# Patient Record
Sex: FEMALE | Race: WHITE | NOT HISPANIC OR LATINO | URBAN - METROPOLITAN AREA
[De-identification: names, ages, dates, MRNs, and addresses within clinical notes are randomized per-mention and may not be internally consistent; named-entity substitution may affect disease eponyms.]

---

## 2018-05-21 ASSESSMENT — MIFFLIN-ST. JEOR: SCORE: 1380.86

## 2018-05-23 ENCOUNTER — ANESTHESIA - HEALTHEAST (OUTPATIENT)
Dept: SURGERY | Facility: HOSPITAL | Age: 28
End: 2018-05-23

## 2018-05-23 ENCOUNTER — SURGERY - HEALTHEAST (OUTPATIENT)
Dept: SURGERY | Facility: HOSPITAL | Age: 28
End: 2018-05-23

## 2019-09-27 ENCOUNTER — RECORDS - HEALTHEAST (OUTPATIENT)
Dept: ADMINISTRATIVE | Facility: OTHER | Age: 29
End: 2019-09-27

## 2019-09-30 ENCOUNTER — ANESTHESIA - HEALTHEAST (OUTPATIENT)
Dept: OBGYN | Facility: HOSPITAL | Age: 29
End: 2019-09-30

## 2019-10-08 ENCOUNTER — AMBULATORY - HEALTHEAST (OUTPATIENT)
Dept: PEDIATRICS | Facility: CLINIC | Age: 29
End: 2019-10-08

## 2019-10-12 ENCOUNTER — COMMUNICATION - HEALTHEAST (OUTPATIENT)
Dept: SCHEDULING | Facility: CLINIC | Age: 29
End: 2019-10-12

## 2019-10-21 ENCOUNTER — AMBULATORY - HEALTHEAST (OUTPATIENT)
Dept: PEDIATRICS | Facility: CLINIC | Age: 29
End: 2019-10-21

## 2019-10-25 ENCOUNTER — AMBULATORY - HEALTHEAST (OUTPATIENT)
Dept: PEDIATRICS | Facility: CLINIC | Age: 29
End: 2019-10-25

## 2021-05-26 ENCOUNTER — RECORDS - HEALTHEAST (OUTPATIENT)
Dept: ADMINISTRATIVE | Facility: CLINIC | Age: 31
End: 2021-05-26

## 2021-05-28 ENCOUNTER — RECORDS - HEALTHEAST (OUTPATIENT)
Dept: ADMINISTRATIVE | Facility: CLINIC | Age: 31
End: 2021-05-28

## 2021-06-01 VITALS — BODY MASS INDEX: 20.46 KG/M2 | WEIGHT: 135 LBS | HEIGHT: 68 IN

## 2021-06-01 NOTE — ANESTHESIA PROCEDURE NOTES
Epidural Block    Patient location during procedure: OB  Time Called: 9/30/2019 11:01 PM  Reason for Block:labor epidural  Staffing:  Performing  Anesthesiologist: Gregorio Mendoza MD  Preanesthetic Checklist  Completed: patient identified, risks, benefits, and alternatives discussed, timeout performed, consent obtained, at patient's request, airway assessed, oxygen available, suction available, emergency drugs available and hand hygiene performed  Procedure  Patient position: sitting  Prep: ChloraPrep  Patient monitoring: continuous pulse oximetry  Approach: midline  Location: L3-L4  Injection technique: ANAYELI saline  Number of Attempts:1  Needle  Needle type: Arvin   Needle gauge: 18 G     Catheter in Space: 5  Assessment  Sensory level: T8  No complications      Additional Notes:  Called to patient's room for epidural  Consent obtained  Timeput performed  RN at bedside during procedure

## 2021-06-01 NOTE — ANESTHESIA POSTPROCEDURE EVALUATION
Patient: Claribel Rebolledo  Anesthesia type: epidural    Patient location: Labor and Delivery  Last vitals:   Pulse Readings from Last 1 Encounters:   10/01/19 63     SpO2 Readings from Last 1 Encounters:   10/01/19 97%     BP Readings from Last 1 Encounters:   10/01/19 107/60     Resp Readings from Last 1 Encounters:   10/01/19 16     Temp Readings from Last 1 Encounters:   10/01/19 36.7  C (98  F) (Oral)       Post vital signs: stable  Level of consciousness: awake and responds to simple questions  Post-anesthesia pain: pain controlled  Post-anesthesia nausea and vomiting: no  Pulmonary: unassisted, return to baseline  Cardiovascular: stable and blood pressure at baseline  Hydration: adequate  Anesthetic events: no    QCDR Measures:  ASA# 11 - Pallavi-op Cardiac Arrest: ASA11B - Patient did NOT experience unanticipated cardiac arrest  ASA# 12 - Pallavi-op Mortality Rate: ASA12B - Patient did NOT die  ASA# 13 - PACU Re-Intubation Rate: NA - No ETT / LMA used for case  ASA# 10 - Composite Anes Safety: ASA10A - No serious adverse event    Additional Notes:  Neuraxial block partially worn off, some mild heaviness in left lower extremity. Pain controlled. Ambulating and voiding without issue. Denies headache or back pain.

## 2021-06-02 NOTE — TELEPHONE ENCOUNTER
"PCP Dr Jeniffer Horvath @ [facility]  Childbirth 10/1 Home care visit a week ago.  Woke up this morning with a swollen painful lump in her right underarm. The lump is about a little bigger than pea sized, possibly marble sized. It hurts with movement or touching the area. Pain=\"3\" currently. She pumped and massaged the area, but this did not help. She tried a warm compress for 15 min and it helped a little.   The lump is not red or hot. No drainage. No problem with her breast noted. She does not feel like she has a fever. (She has not checked her temperature yet, but she will monitor her temperature at least every day.  For the last 24 hrs she has had mild (\"3\") cramping with urination, no burning. No previous hx of UTI. Cramping is in the pubic area, just towards the end of urination, and while urinating. Occasionally it happens when not urinating, briefly. Low back pain yesterday, she took Ibuprofen and it was relieved. No back pain today.     Reason for Disposition    [1] Very tender to the touch AND [2] no fever    Pain or burning with urination    Protocols used: LYMPH NODES [id number]-R-FC, POSTPARTUM - URINATION PAIN-A-AH    Triaged to a disposition of See provider within 24 hrs. She may continue to apply warm compresses to the lump, since it felt better afterwards. Discussed options: WIC, UC, UR, or ER within 24 hrs. Also discussed option of contacting oncall PCP if desired.    Jayla Sherman RN Triage Nurse Advisor  "

## 2021-06-02 NOTE — PROGRESS NOTES
[facility] Lactation Visit    Assessment:  Susan Rebolledo is a 2 wk.o. old female infant born at Gestational Age: 37w5d via Vaginal, Spontaneous delivery on 10/1/2019 at 2:01 AM here for lactation support.    Susan Rebolledo is doing well. Susan Rebolledo has gained 8.4 oz since their last visit 7 days ago; approximately 1.2 oz per day.  She is up 7% from birthweight. She was able to transfer 1.1 oz from the breast today but required a nipple shield to sustain a latch. This is less intake that I would expect an infant as this age. Discussed supplementation as needed after nursing.     1.  difficulty in feeding at breast  cholecalciferol, vitamin D3, 400 unit/drop Drop       Plan:    Feeding plan:  It is important for you to breast-feed Susan Rebolledo every 2-3 hours or more frequently based on Susan Rebolledo's cues. This should be about 8-12 times a day. Start all feeds at the breast first. Offer both breasts for each feeding. The goal is for Susan to feed more frequently at the breast. Listen for swallows. If swallows have slowed down, stimulate Susan to actively nurse. May express breast milk in her mouth to help stimulate sucking.     Supplementation plan:  Susan Rebolledo should be supplemented with expressed breast-milk or formula after breast-feeding as needed. May offer supplement if Susan is still showing signs of hunger immediately after nursing.     Pumping plan:  Pump as needed for relief. Pump especially after nursing if breasts still feel full or heavy. If supply seems to be decreasing, may try power pumping for 1 hour. May try mother's milk tea or go-lacta to help increase milk supply.    Start Vitamin D supplementation 400 international units, once a day.     Susan Rebolledo should have about 6-8 wet diapers and about 2-4 stools per day.     Follow up in clinic in 1-2 weeks for a follow up and weight  check.  Please return to clinic sooner, if Susan Rebolledo is not wanting to feed, becomes more sleepy, has less than 4-6 wet diapers in a day, develops a fever greater than 100.4 F, or is inconsolable.   ____________________________________________________________________  SUBJECTIVE:     Susan Rebolledo is a 2 wk.o. old female infant born at Gestational Age: 37w5d via Vaginal, Spontaneous delivery on 10/1/2019 at 2:01 AM here for lactation support. This patient is accompanied in the office by her mother. She was seen in clinic on 10/8/19. Concern for jaundice at that time but not at phototherapy range. She had been mainly bottle-feeding prior to the clinic visit on 10/8/19 and has been working on transitioning to more frequent feedings at the breast.     Baby is nursing every 2-3 hours for about 20-30 minutes per session.   Mother reports hearing audible swallows.   Baby feeds about 4-6 times in 24 hours and wakes up on own for feeds. She would supplement 3-4 times a day. She gets about 2-3 oz. Mother sometimes skips breast-feeding and offers a bottle of 2-3 oz. All bottles are pumped milk.    Baby has about 7 wet diapers and 3 stools per day. Stools are yellow in color.    Mom is also pumping about 4-5 per day and gets about  ml per pumping session.     Patient Active Problem List    Diagnosis Date Noted     Feeding problem of  10/03/2019     Fetal and  jaundice 10/03/2019     Term , current hospitalization 10/01/2019     SGA (small for gestational age) 10/01/2019     Results for orders placed or performed in visit on 10/08/19   Bilirubin, Panel   Result Value Ref Range    Bilirubin, Direct 0.4 <=0.5 mg/dL    Bilirubin, Indirect 13.8 (H) 0.0 - 6.0 mg/dL    Bilirubin, Total 14.2 (H) 0.0 - 6.0 mg/dL       Current Outpatient Medications:      cholecalciferol, vitamin D3, 400 unit/drop Drop, Take 1 drop by mouth daily., Disp: , Rfl: 0  No past medical history on file.  No  past surgical history on file.  No family history on file.    Primary care provider: Jeniffer Horvath MD    OBJECTIVE:    Mother:   Nipples are everted, the areola is compressible, the breast is soft and full.     Sore nipples: none   Maternal pain: none    Maternal depression screening: Doing well    Infant:   Vitals:    10/15/19 1349   Temp: 98.5  F (36.9  C)   TempSrc: Rectal   Weight: 5 lb 15.5 oz (2.707 kg)       Average weight gain over last 7 days: 8.4 oz     Weight:   Birthweight: 5 lb 9 oz (2.523 kg)  Clinic weight on 10/8/19: 5 lbs 7.1 oz  Today's weight:   Vitals:    10/15/19 1349   Weight: 5 lb 15.5 oz (2.707 kg)       7% from birth weight.    Lactation scale pre-feeding weight: 5 lbs 15.5 oz  Weight after left side feedin lbs 15.9 oz  Weight after right side feedin lbs 0.6 oz  Amount of milk transferred from LEFT side: 0.4 oz  Amount of milk transferred from RIGHT side: 0.7 oz    Total transfer: 1.1 oz    Feeding assessment:     Infant can draw gloved finger into mouth. Infant demonstrated a coordinated suck. Infant palate is intact, tongue over gums, palpable frenulum  Infant can hold suction with tongue while at the breast for only a few sucks. Infant unlatches and then tries again. She required a nipple shield to sustain a longer latch.  Infant needed frequent stimulation at the breast.     Alignment: Infant's head was aligned with its trunk. Infant did face mother. Baby was in cross-cradle position today. Discussed importance of infant ventral positioning to obtain a deeper latch.     Areolar Grasp: Infant was able to open mouth wide with a nipple shield. Infant was assisted by gently applying downward pressure to the chin to open mouth wide with latching attempts without a nipple shield. Infant's lips were not pursed. Infant's lips did flange outward. Tongue was visible just barely over bottom lip. Infant had complete seal with a nipple shield.     Areolar Compression: Infant made  "rhythmic motion. There were no clicking or smacking sounds. There was no severe nipple discomfort. Nipples appeared round after feeding.    Audible swallowing: Infant made a few quiet sounds of swallowing on the left. More audible swallows noted on the right. There was an increase in frequency after milk ejection reflex.     PHYSICAL EXAM    Gen: Alert, no acute distress.   Head: Anterior and posterior fontanelles are flat and soft.   Eyes: No eye drainage. Sclera clear. Bilateral red reflexes present.   Ears: Pinna appear well-formed. No pits.   Nose: nares patent. No nasal congestion. No nasal flaring.  Mouth: Oral mucosa moist. Tongue midline (tongue elevation adequate when crying, good lateralization). Frenulum palpable. No \"heart-shaped\" tongue. Tongue able to extend pass lower gumline. Lips closed at rest.   Neck: Clavicles intact bilaterally.  Lungs: Clear to auscultation bilaterally.   Cardiac: Regular regular rate and rhythm, S1S2, no murmurs. Brachial and femoral pulses +2 and equal.  Abdomen: Soft, nontender, bowel sounds present, no hepatosplenomegaly or mass palpable. Umbilicus dry with no erythema or drainage.   : Paul stage 1 female genitalia  Skin: Intact. Dry. No rash. No jaundice.   Musculoskeletal: equal movements of upper and lower extremities. Negative Ortolani and Hurley maneuver.  Neuro: Appropriate muscle tone.    The visit lasted a total of 40 minutes that I spent face to face with the patient. Of that time, 35 minutes were spent on lactation. Over 50% of the time was spent counseling and educating the patient about normal  care and growth.    Completed by:   Solo Alcala, MARIA LUISA, CPNP, IBCLC  [facility]  [facility]  10/21/2019, 1:51 PM  "

## 2021-06-02 NOTE — PROGRESS NOTES
[facility] Lactation Visit    Assessment:  Susan Rebolledo is a 3 wk.o. old female infant born at Gestational Age: 37w5d via Vaginal, Spontaneous delivery on 10/1/2019 at 2:01 AM here for lactation support.    Susan Rebolledo is doing well. Susan Rebolledo has gained 11.9 oz since their last visit 10 days ago; approximately 1.1 oz per day, which is adequate at this age.  She is up 21% from birthweight. She is now mainly breast-feeding in clinic. She was able to transfer 3.2 oz from the breast today. She required the nipple shield for part of the feeding on the left side. Encouraged to start feedings without a nipple shield to help wean off the shield.     1.  difficulty in feeding at breast         Plan:    Feeding plan:  It is important for you to breast-feed Susan Rebolledo every 2-3 hours or more frequently based on Susan Rebolledo's cues. This should be about 8-12 times a day. Now start all feedings without a nipple shield. May use a nipple shield if unable to latch. Make sure lips are flanged. If latch is painful, unlatch and try again. Burp after each breast and after feeding.     Supplementation plan:  Supplement only as needed.     Pumping plan:  Pump only as needed. Pump after nursing if breasts still feel full.     Start Vitamin D supplementation 400 international units, once a day.    Susan Rebolledo should have about 6-8 wet diapers and about 2-4 stools per day.     Follow up in clinic in for 2 month wellness visit.  Return to clinic as needed for lactation visit.    Please return to clinic sooner, if Susan Rebolledo is not wanting to feed, becomes more sleepy, has less than 4-6 wet diapers in a day, develops a fever greater than 100.4 F, or is inconsolable.   ____________________________________________________________________  SUBJECTIVE:     Susan Rebolledo is a 3 wk.o. old female infant born at Gestational Age:  37w5d via Vaginal, Spontaneous delivery on 10/1/2019 at 2:01 AM here for lactation support. This patient is accompanied in the office by her mother and father.    Concerns: working on transitioning to more frequent breast-feeding. Still using a nipple shield and now would like to wean off nipple shield    Baby is nursing every 2-2.5/3 hours for about 10-15 minutes per session. Gets one feeding of bottled pumped milk 2-3 oz. Has needed supplementation immediately after nursing 1-2 times a day for about 1 oz each time.  Mother reports hearing audible swallows.   Baby feeds about 9-10 times in 24 hours and wakes up on own for feeds sometimes.    Baby has about 7 wet diapers and 1-5 stools per day. Stools are yellow in color.    Mom is also pumping about 1-2 per day and gets about 3-4 oz per pumping session.    Patient Active Problem List    Diagnosis Date Noted     Feeding problem of  10/03/2019     Fetal and  jaundice 10/03/2019     Term , current hospitalization 10/01/2019     SGA (small for gestational age) 10/01/2019     Results for orders placed or performed in visit on 10/08/19   Bilirubin, Panel   Result Value Ref Range    Bilirubin, Direct 0.4 <=0.5 mg/dL    Bilirubin, Indirect 13.8 (H) 0.0 - 6.0 mg/dL    Bilirubin, Total 14.2 (H) 0.0 - 6.0 mg/dL       Current Outpatient Medications:      cholecalciferol, vitamin D3, 400 unit/drop Drop, Take 1 drop by mouth daily., Disp: , Rfl: 0  No past medical history on file.  No past surgical history on file.  No family history on file.    Primary care provider: Jeniffer Horvath MD    OBJECTIVE:    Mother:   Nipples are everted, the areola is compressible, the breast is soft and full.     Sore nipples: none   Maternal pain: none.    Maternal depression screening: Doing well    Infant:   Vitals:    10/25/19 1357   Pulse: 168   Temp: 98.8  F (37.1  C)   TempSrc: Axillary   Weight: 6 lb 11.4 oz (3.045 kg)       Average weight gain over last 10 days:  11.9 oz; approx 1.1 oz per day     Weight:   Birthweight: 5 lb 9 oz (2.523 kg)  Clinic weight on 10/15/19: 5 lbs 15.5 oz  Today's weight:   Vitals:    10/25/19 1357   Weight: 6 lb 11.4 oz (3.045 kg)       21% from birth weight.    Lactation scale pre-feeding weight: 6 lbs 11.4 oz  Weight after left side feedin lbs 14 oz  Weight after right side feedin lbs 14.6 oz  Amount of milk transferred from LEFT side: 2.6 oz oz  Amount of milk transferred from RIGHT side: 0.6 oz    Total transfer: 3.2 oz    Feeding assessment:     Infant can draw gloved finger into mouth. Infant demonstrated a coordinated suck. Infant palate is intact, tongue over gums, frenulum palpable.  Infant can hold suction with tongue while at the breast. She latched on the left side for about 5 minutes with a nipple shield. She then would unlatch herself and then try to latch again. A nipple shield was used to help sustain a longer latch. Infant did not need frequent stimulation at the breast.     Alignment: Infant's head was aligned with its trunk. Infant did face mother. Baby was in cross-cradle position today. Discussed importance of infant ventral positioning to obtain a deeper latch.     Areolar Grasp: Infant was able to open mouth wide with a nipple shield Infant was assisted by gently applying downward pressure to the chin to open mouth wide without a nipple shield. Infant's lips were not pursed. Infant's lips did flange outward. Tongue was visible just barely over bottom lip. Infant had complete seal with and without a nipple shield.     Areolar Compression: Infant made rhythmic motion. There were no clicking or smacking sounds. There was no severe nipple discomfort.  Nipples appeared round after feeding.    Audible swallowing: Infant made quiet sounds of swallowing. There was an increase in frequency after milk ejection reflex.     PHYSICAL EXAM    Gen: Alert, no acute distress.   Head: Anterior and posterior fontanelles are flat and  "soft.   Eyes: No eye drainage. Sclera clear.  Ears: Pinna appear well-formed. No pits.   Nose: nares patent. No nasal congestion. No nasal flaring.  Mouth: Oral mucosa moist. Tongue midline (tongue elevation adequate when crying, good lateralization). Frenulum palpable. No \"heart-shaped\" tongue. Tongue able to extend pass lower gumline. Lips closed at rest.   Neck: Clavicles intact bilaterally.  Lungs: Clear to auscultation bilaterally.   Cardiac: Regular regular rate and rhythm, S1S2, no murmurs. Brachial and femoral pulses +2 and equal.  Abdomen: Soft, nontender, bowel sounds present, no hepatosplenomegaly or mass palpable. Umbilicus dry dry with no erythema or drainage.   : Paul stage 1 female genitalia  Skin: Intact. Dry. Mild erythema toxicum rash on torso. No jaundice.   Musculoskeletal: equal movements of upper and lower extremities. Negative Ortolani and Hurley maneuver.  Neuro: Appropriate muscle tone.    The visit lasted a total of 40 minutes that I spent face to face with the patient. Of that time, 35 minutes were spent on lactation. Over 50% of the time was spent counseling and educating the patient about normal  care and growth, breast-feeding, weaning off nipple shield, milk supply.    Completed by:   MARIA LUISA Shelton, CPNP, IBCLC  [facility]  [facility]  10/25/2019, 2:04 PM                "

## 2021-06-02 NOTE — PROGRESS NOTES
[facility] Lactation Visit    Assessment:  Susan Rebolledo is a 7 days old female infant born at Gestational Age: 37w5d via Vaginal, Spontaneous delivery on 10/1/2019 at 2:01 AM here for lactation support.    Susan Rebolledo is doing well. Susan Rebolledo has gained 2.1 oz since their last visit 3 days ago; approximately 0.7 oz per day.  She is down -2% from birthweight. She is mainly bottle-feeding at home and nursing at least 1-2 times a day with a nipple shield. Infant was able to sustain a latch today with a nipple shield and was able to transfer 0.8 oz. We discussed to start all feedings at the breast before any bottles to help infant transition to more frequent breast-feeding. Discussed to continue with supplementation after nursing as infant becomes more efficient with breast-feeding. Discussed the importance of watching for infant cues and listening for swallows vs watching clock/time.     Infant also has jaundice down to her chest today. Her most recent bilirubin was 10.9 at 59 hours, low intermediate risk. Hyperbilirubinemia risk factors include exclusive breast-feeding and difficulty with nursing. Ordered a serum bilirubin today and will call family with results. Plan to follow up in clinic in 2 days for jaundice. Discussed with parents that they may cancel the close follow up appointment if bilirubin result is WNL.     1. Fetal and  jaundice  Bilirubin, Panel   2.  difficulty in feeding at breast  Bilirubin, Panel       Plan:    Feeding plan:  It is important for you to breast-feed Susan Rebolledo every 2-3 hours or more frequently based on Susan Rebolledo's cues. This should be about 8-12 times a day. It is important that you don't wait longer than 3 hours for the next feeding. Make sure to offer of breasts first before any bottles. Attempt nursing without a nipple shield first. If unable to latch infant for a minute, may use a nipple  shield. Stimulate Sarah if she is starting to fall asleep at the breast. Listen for swallows. If swallows are less frequent, express some breast milk in her mouth to help stimulate sucking. Offer both breasts for each feeding. If baby is sleepy on one side even after stimulation, unlatch baby, burp her, and switch to the other side. Typically baby will need to feed 10-15 minutes on each side. But it's important to not watch the clock. Watch baby, instead, to ensure she is actively nursing.     Supplementation plan:  Susan Rebolledo should be supplemented with expressed breast-milk or formula after breast-feeding to ensure that Susan DEEPALI Parkbrugger is full and content. You can supplement with a slow-flow bottle nipple. May offer 1-1.5 oz after nursing if infant is still showing signs of hunger.  This amount may increase based on her cues. If infant breast-feeds well with audible swallows and is sleepy after nursing, may skip supplementation. Make sure to watch and look for infant cues. Don't watch the clock.     Pumping plan:  To ensure you have adequate milk supply, you should continue to pump after feeds as much as able. The more your breast-feed and pump, the more you make more milk. Empty breasts gives you more milk for the next feeding.     Start Vitamin D supplementation 400 international units, once a day, when Susan Rebolledo is back to birthweight.    Susan Rebolledo should have about 6-8 wet diapers and about 2-4 stools per day.     Follow up in clinic in 2 days for a follow up on jaundice.  May cancel this appointment if jaundice is not worrisome with lab results.  Return to clinic in 1 week for lactation visit.    Please return to clinic sooner, if Susan Rebolledo is not wanting to feed, becomes more sleepy, has less than 4-6 wet diapers in a day, develops a fever greater than 100.4 F, increasing signs of jaundice, or is inconsolable.  ____________________________________________________________________  SUBJECTIVE:     Susan Rebolledo is a 7 days old female infant born at Gestational Age: 37w5d via Vaginal, Spontaneous delivery on 10/1/2019 at 2:01 AM here for lactation support. This patient is accompanied in the office by her mother and father. She was discharged on 10/3/19.     Concerns: fed well with a bottle. Mom is using nipple shield for nursing. Baby has been bottle-feeding due to weight loss in the hospital.    Baby is nursing with a nipple shield every 1-2 times a day for about 5-10 minutes per session.   Mother reports hearing audible swallows.     She is bottle-feeding for the rest of the feedings. She is taking 30-45 ml of pumped milk every 2.5-3.5 hours.    Baby feeds about 8 times in 24 hours. She is sleepy, but mostly sleepy while bottle-feeding.     Baby has about 7 wet diapers and 4 stools per day. Stools are yellow in color.    Mom is also pumping every 3-4 hours and gets about  ml per pumping session. Mom noticed her breasts grew larger and areolas darkened during pregnancy and she noticed primary engorgement when her milk came in on day 3.    Breastfeeding Goals: no pumping. Ideally without nipple shield    Previous Breastfeeding Experience: none.    Breast-surgery: none.    Maternal medications: vitamin and stool softener.  Infant medications: none.    Hospital course: serum bilirubin of 10.9 at 59 hours, low intermediate risk.     metabolic screening: normal    Patient Active Problem List    Diagnosis Date Noted     Feeding problem of  10/03/2019     Fetal and  jaundice 10/03/2019     Term , current hospitalization 10/01/2019     SGA (small for gestational age) 10/01/2019     Results for orders placed or performed during the hospital encounter of 10/01/19   [place] Metabolic Screen   Result Value Ref Range    Scan Result See Scanned Report    Bilirubin,  Total   Result Value  Ref Range    Bilirubin, Total 10.9 (H) 0.0 - 7.0 mg/dL    Age in Hours 59 hours   POCT Glucose   Result Value Ref Range    Glucose 59 51 - 139 mg/dL   POCT Glucose   Result Value Ref Range    Glucose 61 51 - 139 mg/dL   POCT Glucose   Result Value Ref Range    Glucose 43 (L) 51 - 139 mg/dL   POCT Glucose   Result Value Ref Range    Glucose 79 51 - 139 mg/dL   POCT Glucose   Result Value Ref Range    Glucose 42 (L) 51 - 139 mg/dL   POCT Glucose   Result Value Ref Range    Glucose 58 51 - 139 mg/dL   POCT Glucose   Result Value Ref Range    Glucose 58 51 - 139 mg/dL   POCT Glucose   Result Value Ref Range    Glucose 56 51 - 139 mg/dL   POCT Glucose   Result Value Ref Range    Glucose 57 51 - 139 mg/dL   POCT Glucose   Result Value Ref Range    Glucose 61 51 - 139 mg/dL   POCT Glucose   Result Value Ref Range    Glucose 49 (L) 51 - 139 mg/dL   POCT Glucose   Result Value Ref Range    Glucose 62 51 - 139 mg/dL     No current outpatient medications on file.  No past medical history on file.  No past surgical history on file.  No family history on file.    Primary care provider: Jeniffer Horvath MD    OBJECTIVE:    Mother:   Nipples are everted, the areola is compressible, the breast is soft and full.     Sore nipples: mild erythema   Maternal pain: none.    Maternal depression screening: Doing well    Infant:   Vitals:    10/08/19 1307   Pulse: 140   Temp: 98.4  F (36.9  C)   TempSrc: Rectal   Weight: 5 lb 7.1 oz (2.469 kg)       Average weight gain over last 3 days: 2.1 oz     Weight:   Birthweight: 5 lb 9 oz (2.523 kg)  Discharge weight on 10/3/19: 5 lbs 4.6 oz  Clinic weight on 10/5/19: 5 lbs 5 oz  Today's weight:   Vitals:    10/08/19 1307   Weight: 5 lb 7.1 oz (2.469 kg)       -2% from birth weight.    Lactation scale pre-feeding weight: 5 lbs 7.1 oz  Weight after left side feedin lbs 7.6 oz  Weight after right side feedin lbs 7.9 oz  Amount of milk transferred from LEFT side: 0.5 oz  Amount of milk  "transferred from RIGHT side: 0.3 oz    Total transfer: 0.8 oz    Feeding assessment:     Infant can draw gloved finger into mouth. Infant demonstrated a coordinated suck. Infant palate is intact, tongue over gums, palpable frenulum.   Infant unable to hold suction with tongue while at the breast. Infant needed frequent stimulation at the breast. Mother expressed breast milk in her mouth to help stimulate sucking.     Alignment: Infant's head was aligned with its trunk. Infant did face mother. Baby was in cross-cradle position today. Discussed importance of infant ventral positioning to obtain a deeper latch.     Areolar Grasp: Infant was able to open mouth wide.  Infant's lips were not pursed. Infant's lips did flange outward. Tongue was visible just barely over bottom lip. Infant had complete seal with a nipple shield. Unable to latch without a nipple shield.     Areolar Compression: Infant made rhythmic motion. There were no clicking or smacking sounds. There was no severe nipple discomfort.  Nipples appeared round and red after feeding.    Audible swallowing: Infant made quiet sounds of swallowing. There was an increase in frequency after milk ejection reflex.     PHYSICAL EXAM    Gen: Alert, no acute distress.   Head: Anterior and posterior fontanelles are flat and soft.   Eyes: bilateral yellow drainage. Scleral icterus.  Ears: Pinna appear well-formed. No pits.   Nose: nares patent. No nasal congestion. No nasal flaring.  Mouth: Oral mucosa moist. Tongue midline (tongue elevation adequate when crying, good lateralization). Frenulum palpable. No \"heart-shaped\" tongue. Tongue able to extend pass lower gumline. Lips closed at rest.   Neck: Clavicles intact bilaterally.  Lungs: Clear to auscultation bilaterally.   Cardiac: Regular regular rate and rhythm, S1S2, no murmurs. Brachial and femoral pulses +2 and equal.  Abdomen: Soft, nontender, bowel sounds present, no hepatosplenomegaly or mass palpable. Umbilicus " dry with no erythema or drainage.   : Paul stage 1 female genitalia  Skin: Intact. Dry. No rash. Moderate jaundice down to chest.   Musculoskeletal: equal movements of upper and lower extremities. Negative Ortolani and Hurley maneuver.  Neuro: Appropriate muscle tone.    The visit lasted a total of 60 minutes that I spent face to face with the patient. Of that time, 55 minutes were spent on lactation. Over 50% of the time was spent counseling and educating the patient about normal  care and growth, breast-feeding, latching, milk supply, lacrimal dacryostenosis.    Completed by:   Solo Alcala, MARIA LUISA, CPNP, IBCLC  [facility]  [facility]  10/8/2019, 1:03 PM

## 2021-06-18 NOTE — ANESTHESIA PREPROCEDURE EVALUATION
Anesthesia Evaluation      Patient summary reviewed   No history of anesthetic complications     Airway   Mallampati: I  Neck ROM: full   Pulmonary - negative ROS and normal exam                          Cardiovascular - negative ROS and normal exam  Rhythm: regular  Rate: normal,         Neuro/Psych - negative ROS     Endo/Other - negative ROS      GI/Hepatic/Renal - negative ROS           Dental - normal exam                        Anesthesia Plan  Planned anesthetic: MAC    ASA 1     Anesthetic plan and risks discussed with: patient    Post-op plan: routine recovery

## 2021-06-18 NOTE — ANESTHESIA POSTPROCEDURE EVALUATION
Patient: Claribel Cary  HYSTEROSCOPY,  DILATION AND CURETTAGE  Anesthesia type: MAC    Patient location: PACU  Last vitals:   Vitals:    05/23/18 1115   BP: 115/62   Pulse: 80   Resp: 14   Temp:    SpO2: 100%     Post vital signs: stable  Level of consciousness: alert and conversant  Post-anesthesia pain: pain controlled  Post-anesthesia nausea and vomiting: no  Pulmonary: room air  Cardiovascular: stable and blood pressure at baseline  Hydration: adequate  Anesthetic events: no    QCDR Measures:  ASA# 11 - Pallavi-op Cardiac Arrest: ASA11B - Patient did NOT experience unanticipated cardiac arrest  ASA# 12 - Pallavi-op Mortality Rate: ASA12B - Patient did NOT die  ASA# 13 - PACU Re-Intubation Rate: ASA13B - Patient did NOT require a new airway mgmt  ASA# 10 - Composite Anes Safety: ASA10A - No serious adverse event    Additional Notes:

## 2021-06-18 NOTE — ANESTHESIA CARE TRANSFER NOTE
"Last vitals:   Vitals:    05/23/18 1100   BP: 113/58   Pulse: 75   Resp: 16   Temp: 37.6  C (99.7  F)   SpO2: 100%     Patient's level of consciousness is drowsy but awake. Rates pain 1/10. States she is \"comfortable\".  Spontaneous respirations: yes  Maintains airway independently: yes  Dentition unchanged: yes  Oropharynx: oropharynx clear of all foreign objects    QCDR Measures:  ASA# 20 - Surgical Safety Checklist: WHO surgical safety checklist completed prior to induction  PQRS# 430 - Adult PONV Prevention: 4558F - Pt received => 2 anti-emetic agents (different classes) preop & intraop  ASA# 8 - Peds PONV Prevention: NA - Not pediatric patient, not GA or 2 or more risk factors NOT present  PQRS# 424 - Pallavi-op Temp Management: 4559F - At least one body temp DOCUMENTED => 35.5C or 95.9F within required timeframe  PQRS# 426 - PACU Transfer Protocol: - Transfer of care checklist used  ASA# 14 - Acute Post-op Pain: ASA14B - Patient did NOT experience pain >= 7 out of 10  "

## 2022-11-23 LAB
ABO (EXTERNAL): NORMAL
HEMOGLOBIN (EXTERNAL): 12.9 G/DL (ref 11.6–15.1)
HEPATITIS B SURFACE ANTIGEN (EXTERNAL): NEGATIVE
HEPATITIS C ANTIBODY (EXTERNAL): NEGATIVE
HIV1+2 AB SERPL QL IA: NEGATIVE
RH (EXTERNAL): POSITIVE
RUBELLA ANTIBODY IGG (EXTERNAL): NORMAL
VDRL (SYPHILIS) (EXTERNAL): NEGATIVE

## 2023-05-08 ENCOUNTER — TRANSFERRED RECORDS (OUTPATIENT)
Dept: HEALTH INFORMATION MANAGEMENT | Facility: CLINIC | Age: 33
End: 2023-05-08

## 2023-05-08 LAB — GROUP B STREPTOCOCCUS (EXTERNAL): NEGATIVE

## 2023-05-13 ENCOUNTER — HOSPITAL ENCOUNTER (OUTPATIENT)
Facility: HOSPITAL | Age: 33
End: 2023-05-13
Admitting: FAMILY MEDICINE
Payer: COMMERCIAL

## 2023-05-13 ENCOUNTER — HOSPITAL ENCOUNTER (OUTPATIENT)
Facility: HOSPITAL | Age: 33
Discharge: HOME | End: 2023-05-13
Attending: FAMILY MEDICINE | Admitting: FAMILY MEDICINE
Payer: COMMERCIAL

## 2023-05-13 VITALS — SYSTOLIC BLOOD PRESSURE: 113 MMHG | DIASTOLIC BLOOD PRESSURE: 58 MMHG | TEMPERATURE: 98.6 F | RESPIRATION RATE: 18 BRPM

## 2023-05-13 PROBLEM — Z36.89 ENCOUNTER FOR TRIAGE IN PREGNANT PATIENT: Status: ACTIVE | Noted: 2023-05-13

## 2023-05-13 PROCEDURE — G0463 HOSPITAL OUTPT CLINIC VISIT: HCPCS

## 2023-05-13 RX ORDER — LIDOCAINE 40 MG/G
CREAM TOPICAL
Status: DISCONTINUED | OUTPATIENT
Start: 2023-05-13 | End: 2023-05-13 | Stop reason: HOSPADM

## 2023-05-13 NOTE — PROGRESS NOTES
Data: Patient assessed in the [facility] for vaginal bleeding. Cervical exam deferred. Membranes intact. Contractions are not present. See flowsheets for fetal assessment documentation.     Action: Presumed adequate fetal oxygenation documented. Discharge instructions reviewed. Patient instructed to report change in fetal movement, vaginal leaking of fluid or bleeding, abdominal pain, or any concerns related to the pregnancy to provider/clinic.      Response: Orders to discharge home per provider. Patient verbalized understanding of education and agreement with plan. Discharged to home at 1828.    Monica Garcia RN

## 2023-05-13 NOTE — PROGRESS NOTES
[facility] light brown spotting that began this AM. Patient noted small brown spotting on her underwear, then a little when she wiped and RN observed a small streak of clear brown-stephan discharge on patient's pad on admission.     Patient endorses good fetal movement, denies headache, epigastric pain and visual disturbances. Denies leaking of fluid. FHR Category I, one contraction noted on monitor. Patient states she feels only occasional vanessa bazan activity. Abdomen palpates soft, patient afebrile.    Patient admits to sexual intercourse in the last 48 hours.     Provider updated on patient status, okay to discharge. Patient states she is okay to go home, has clinic appt scheduled for Monday.     Monica Garcia RN

## 2023-05-15 ENCOUNTER — TRANSFERRED RECORDS (OUTPATIENT)
Dept: HEALTH INFORMATION MANAGEMENT | Facility: CLINIC | Age: 33
End: 2023-05-15
Payer: COMMERCIAL

## 2023-06-09 ENCOUNTER — HOSPITAL ENCOUNTER (INPATIENT)
Facility: HOSPITAL | Age: 33
LOS: 1 days | Discharge: HOME | End: 2023-06-10
Attending: FAMILY MEDICINE | Admitting: OBSTETRICS & GYNECOLOGY
Payer: COMMERCIAL

## 2023-06-09 PROBLEM — Z34.90 PREGNANCY: Status: ACTIVE | Noted: 2023-06-09

## 2023-06-09 PROCEDURE — 120N000001 HC R&B MED SURG/OB

## 2023-06-09 PROCEDURE — 250N000013 HC RX MED GY IP 250 OP 250 PS 637: Performed by: OBSTETRICS & GYNECOLOGY

## 2023-06-09 PROCEDURE — 250N000013 HC RX MED GY IP 250 OP 250 PS 637: Performed by: FAMILY MEDICINE

## 2023-06-09 PROCEDURE — 250N000011 HC RX IP 250 OP 636: Performed by: FAMILY MEDICINE

## 2023-06-09 PROCEDURE — 722N000001 HC LABOR CARE VAGINAL DELIVERY SINGLE

## 2023-06-09 RX ORDER — NALOXONE HYDROCHLORIDE 0.4 MG/ML
0.2 INJECTION, SOLUTION INTRAMUSCULAR; INTRAVENOUS; SUBCUTANEOUS
Status: DISCONTINUED | OUTPATIENT
Start: 2023-06-09 | End: 2023-06-09 | Stop reason: HOSPADM

## 2023-06-09 RX ORDER — MODIFIED LANOLIN
OINTMENT (GRAM) TOPICAL
Status: DISCONTINUED | OUTPATIENT
Start: 2023-06-09 | End: 2023-06-10 | Stop reason: HOSPADM

## 2023-06-09 RX ORDER — CARBOPROST TROMETHAMINE 250 UG/ML
250 INJECTION, SOLUTION INTRAMUSCULAR
Status: DISCONTINUED | OUTPATIENT
Start: 2023-06-09 | End: 2023-06-09 | Stop reason: HOSPADM

## 2023-06-09 RX ORDER — PROCHLORPERAZINE 25 MG
25 SUPPOSITORY, RECTAL RECTAL EVERY 12 HOURS PRN
Status: DISCONTINUED | OUTPATIENT
Start: 2023-06-09 | End: 2023-06-09 | Stop reason: HOSPADM

## 2023-06-09 RX ORDER — OXYTOCIN 10 [USP'U]/ML
10 INJECTION, SOLUTION INTRAMUSCULAR; INTRAVENOUS
Status: DISCONTINUED | OUTPATIENT
Start: 2023-06-09 | End: 2023-06-10 | Stop reason: HOSPADM

## 2023-06-09 RX ORDER — IBUPROFEN 800 MG/1
800 TABLET, FILM COATED ORAL
Status: DISCONTINUED | OUTPATIENT
Start: 2023-06-09 | End: 2023-06-10 | Stop reason: HOSPADM

## 2023-06-09 RX ORDER — DOCUSATE SODIUM 100 MG/1
100 CAPSULE, LIQUID FILLED ORAL DAILY
Status: DISCONTINUED | OUTPATIENT
Start: 2023-06-09 | End: 2023-06-10 | Stop reason: HOSPADM

## 2023-06-09 RX ORDER — IBUPROFEN 800 MG/1
800 TABLET, FILM COATED ORAL EVERY 6 HOURS PRN
Status: DISCONTINUED | OUTPATIENT
Start: 2023-06-09 | End: 2023-06-10 | Stop reason: HOSPADM

## 2023-06-09 RX ORDER — METHYLERGONOVINE MALEATE 0.2 MG/ML
200 INJECTION INTRAVENOUS
Status: DISCONTINUED | OUTPATIENT
Start: 2023-06-09 | End: 2023-06-10 | Stop reason: HOSPADM

## 2023-06-09 RX ORDER — MISOPROSTOL 200 UG/1
800 TABLET ORAL
Status: DISCONTINUED | OUTPATIENT
Start: 2023-06-09 | End: 2023-06-09 | Stop reason: HOSPADM

## 2023-06-09 RX ORDER — OXYTOCIN/0.9 % SODIUM CHLORIDE 30/500 ML
100-340 PLASTIC BAG, INJECTION (ML) INTRAVENOUS CONTINUOUS PRN
Status: DISCONTINUED | OUTPATIENT
Start: 2023-06-09 | End: 2023-06-10 | Stop reason: HOSPADM

## 2023-06-09 RX ORDER — ACETAMINOPHEN 325 MG/1
650 TABLET ORAL EVERY 4 HOURS PRN
Status: DISCONTINUED | OUTPATIENT
Start: 2023-06-09 | End: 2023-06-10 | Stop reason: HOSPADM

## 2023-06-09 RX ORDER — BISACODYL 10 MG
10 SUPPOSITORY, RECTAL RECTAL DAILY PRN
Status: DISCONTINUED | OUTPATIENT
Start: 2023-06-09 | End: 2023-06-10 | Stop reason: HOSPADM

## 2023-06-09 RX ORDER — ONDANSETRON 2 MG/ML
4 INJECTION INTRAMUSCULAR; INTRAVENOUS EVERY 6 HOURS PRN
Status: DISCONTINUED | OUTPATIENT
Start: 2023-06-09 | End: 2023-06-09 | Stop reason: HOSPADM

## 2023-06-09 RX ORDER — MISOPROSTOL 200 UG/1
400 TABLET ORAL
Status: DISCONTINUED | OUTPATIENT
Start: 2023-06-09 | End: 2023-06-09 | Stop reason: HOSPADM

## 2023-06-09 RX ORDER — CITRIC ACID/SODIUM CITRATE 334-500MG
30 SOLUTION, ORAL ORAL
Status: DISCONTINUED | OUTPATIENT
Start: 2023-06-09 | End: 2023-06-09 | Stop reason: HOSPADM

## 2023-06-09 RX ORDER — MISOPROSTOL 200 UG/1
800 TABLET ORAL
Status: DISCONTINUED | OUTPATIENT
Start: 2023-06-09 | End: 2023-06-10 | Stop reason: HOSPADM

## 2023-06-09 RX ORDER — OXYTOCIN/0.9 % SODIUM CHLORIDE 30/500 ML
340 PLASTIC BAG, INJECTION (ML) INTRAVENOUS CONTINUOUS PRN
Status: DISCONTINUED | OUTPATIENT
Start: 2023-06-09 | End: 2023-06-09 | Stop reason: HOSPADM

## 2023-06-09 RX ORDER — PROCHLORPERAZINE MALEATE 10 MG
10 TABLET ORAL EVERY 6 HOURS PRN
Status: DISCONTINUED | OUTPATIENT
Start: 2023-06-09 | End: 2023-06-09 | Stop reason: HOSPADM

## 2023-06-09 RX ORDER — KETOROLAC TROMETHAMINE 30 MG/ML
30 INJECTION, SOLUTION INTRAMUSCULAR; INTRAVENOUS
Status: DISCONTINUED | OUTPATIENT
Start: 2023-06-09 | End: 2023-06-10 | Stop reason: HOSPADM

## 2023-06-09 RX ORDER — SODIUM CHLORIDE, SODIUM LACTATE, POTASSIUM CHLORIDE, CALCIUM CHLORIDE 600; 310; 30; 20 MG/100ML; MG/100ML; MG/100ML; MG/100ML
INJECTION, SOLUTION INTRAVENOUS CONTINUOUS
Status: DISCONTINUED | OUTPATIENT
Start: 2023-06-09 | End: 2023-06-09 | Stop reason: HOSPADM

## 2023-06-09 RX ORDER — HYDROCORTISONE 25 MG/G
CREAM TOPICAL 3 TIMES DAILY PRN
Status: DISCONTINUED | OUTPATIENT
Start: 2023-06-09 | End: 2023-06-10 | Stop reason: HOSPADM

## 2023-06-09 RX ORDER — OXYTOCIN/0.9 % SODIUM CHLORIDE 30/500 ML
340 PLASTIC BAG, INJECTION (ML) INTRAVENOUS CONTINUOUS PRN
Status: DISCONTINUED | OUTPATIENT
Start: 2023-06-09 | End: 2023-06-10 | Stop reason: HOSPADM

## 2023-06-09 RX ORDER — OXYTOCIN 10 [USP'U]/ML
10 INJECTION, SOLUTION INTRAMUSCULAR; INTRAVENOUS
Status: COMPLETED | OUTPATIENT
Start: 2023-06-09 | End: 2023-06-09

## 2023-06-09 RX ORDER — NALOXONE HYDROCHLORIDE 0.4 MG/ML
0.4 INJECTION, SOLUTION INTRAMUSCULAR; INTRAVENOUS; SUBCUTANEOUS
Status: DISCONTINUED | OUTPATIENT
Start: 2023-06-09 | End: 2023-06-09 | Stop reason: HOSPADM

## 2023-06-09 RX ORDER — METHYLERGONOVINE MALEATE 0.2 MG/ML
200 INJECTION INTRAVENOUS
Status: DISCONTINUED | OUTPATIENT
Start: 2023-06-09 | End: 2023-06-09 | Stop reason: HOSPADM

## 2023-06-09 RX ORDER — METOCLOPRAMIDE HYDROCHLORIDE 5 MG/ML
10 INJECTION INTRAMUSCULAR; INTRAVENOUS EVERY 6 HOURS PRN
Status: DISCONTINUED | OUTPATIENT
Start: 2023-06-09 | End: 2023-06-09 | Stop reason: HOSPADM

## 2023-06-09 RX ORDER — CARBOPROST TROMETHAMINE 250 UG/ML
250 INJECTION, SOLUTION INTRAMUSCULAR
Status: DISCONTINUED | OUTPATIENT
Start: 2023-06-09 | End: 2023-06-10 | Stop reason: HOSPADM

## 2023-06-09 RX ORDER — ONDANSETRON 4 MG/1
4 TABLET, ORALLY DISINTEGRATING ORAL EVERY 6 HOURS PRN
Status: DISCONTINUED | OUTPATIENT
Start: 2023-06-09 | End: 2023-06-09 | Stop reason: HOSPADM

## 2023-06-09 RX ORDER — METOCLOPRAMIDE 10 MG/1
10 TABLET ORAL EVERY 6 HOURS PRN
Status: DISCONTINUED | OUTPATIENT
Start: 2023-06-09 | End: 2023-06-09 | Stop reason: HOSPADM

## 2023-06-09 RX ORDER — MISOPROSTOL 200 UG/1
400 TABLET ORAL
Status: DISCONTINUED | OUTPATIENT
Start: 2023-06-09 | End: 2023-06-10 | Stop reason: HOSPADM

## 2023-06-09 RX ADMIN — WITCH HAZEL: 500 SOLUTION RECTAL; TOPICAL at 08:25

## 2023-06-09 RX ADMIN — ACETAMINOPHEN 650 MG: 325 TABLET ORAL at 08:25

## 2023-06-09 RX ADMIN — IBUPROFEN 800 MG: 800 TABLET ORAL at 11:54

## 2023-06-09 RX ADMIN — IBUPROFEN 800 MG: 800 TABLET ORAL at 18:43

## 2023-06-09 RX ADMIN — OXYTOCIN 10 UNITS: 10 INJECTION, SOLUTION INTRAMUSCULAR; INTRAVENOUS at 05:00

## 2023-06-09 RX ADMIN — ACETAMINOPHEN 650 MG: 325 TABLET ORAL at 17:12

## 2023-06-09 RX ADMIN — IBUPROFEN 800 MG: 800 TABLET ORAL at 05:14

## 2023-06-09 RX ADMIN — ACETAMINOPHEN 650 MG: 325 TABLET ORAL at 21:54

## 2023-06-09 RX ADMIN — BENZOCAINE AND LEVOMENTHOL: 200; 5 SPRAY TOPICAL at 08:25

## 2023-06-09 RX ADMIN — DOCUSATE SODIUM 100 MG: 100 CAPSULE, LIQUID FILLED ORAL at 08:25

## 2023-06-09 ASSESSMENT — ACTIVITIES OF DAILY LIVING (ADL)
TOILETING_ISSUES: NO
ADLS_ACUITY_SCORE: 18
CONCENTRATING,_REMEMBERING_OR_MAKING_DECISIONS_DIFFICULTY: NO
WALKING_OR_CLIMBING_STAIRS_DIFFICULTY: NO
ADLS_ACUITY_SCORE: 18
ADLS_ACUITY_SCORE: 18
DOING_ERRANDS_INDEPENDENTLY_DIFFICULTY: NO
ADLS_ACUITY_SCORE: 18
CHANGE_IN_FUNCTIONAL_STATUS_SINCE_ONSET_OF_CURRENT_ILLNESS/INJURY: NO
ADLS_ACUITY_SCORE: 18
WEAR_GLASSES_OR_BLIND: NO
DRESSING/BATHING_DIFFICULTY: NO
FALL_HISTORY_WITHIN_LAST_SIX_MONTHS: NO
ADLS_ACUITY_SCORE: 18
DIFFICULTY_EATING/SWALLOWING: NO
ADLS_ACUITY_SCORE: 35

## 2023-06-09 NOTE — L&D DELIVERY NOTE
Vaginal Delivery Note    Name: Claribel Rebolledo  : 1990  MRN: [id number]    PRE DELIVERY DIAGNOSIS  1) 32 year old  3 Para  at 40w4d      2) unknown PMH due to birthing in backseat of car on arrival     POST DELIVERY DIAGNOSIS  1) 32 year old  @ 40w4d  2) Delivery of a viable infant weighing   via     YOB: 2023     Birth Time: 4:45 AM       Augmentation No              Indication: none  Induction No                      Indication: none    Monitors:none     GBS:unknown to me    ROM: SROM  Fluid Type: Clear    Labor Analgesia/Anesthesia:Non-pharmacologic    Cord pH obtained: No  Placenta Date/Time: 2023  4:58 AM   Placenta submitted to Pathology: No    Description of procedure:   32 year old  with PNC w/ AALFA and pregnancy complicated by unknown to me as she delivered in backseat of her car upon arrival presented to L&D with spontaneous rupture of membranes.  Her hospital course was uncomplicated.  Patient progressed to complete with spontaneous rupture of membranes   Shoulder Dystocia No  Operative Vaginal Delivery No  Infant spontaneously delivered over an intact perineum.   IInfant delivered in OA position.  Nuchal cord No       Placenta spontaneously delivered: 2023  4:58 AM  grossly intact with 3 vessel cord.  Infant:  Live, vigorous infant  was handed to mom.    Delivery was complicated by nothing Interventions included fundal massage.    Delivery QBL (mL): 50    Mother and Infant stable.    MARIA LUISA Maxwell CNM    2023 8:06 AM

## 2023-06-09 NOTE — PLAN OF CARE
Problem: Plan of Care - These are the overarching goals to be used throughout the patient stay.    Goal: Plan of Care Review  Outcome: Regulo Sanford is exclusively breastfeeding her baby boy. Has bruised area to right breast from first feeding after birth, otherwise she declines concerns or assistance with breastfeeding.      Utilizing PRN Tylenol and Ibuprofen to help manage uterine cramping.      Fundus firm/midline and 1 below umbilicus. Bleeding light with no clots.      Independently up in room. Tolerating regular foods.     Completed mood assessment and birth certificate.     Remains skin to skin with baby.

## 2023-06-09 NOTE — H&P
"23   Claribel Rebolledo   1990    History&Physical     HPI: 32 year old  at 40w4d who presented and precipitously delivered in her car at the entrance of the hospital; a nurse and CNM from L&D were present at the delivery. The patient was transferred to L&D, and the In House OB delivered the placenta and found no lacerations.     OB History: reviewed  OB History    Para Term  AB Living   3 2 2 0 0 2   SAB IAB Ectopic Multiple Live Births   0 0 0 0 2      # Outcome Date GA Lbr Brendan/2nd Weight Sex Delivery Anes PTL Lv   3 Current            2 Term 21     Vag-Spont   SHOBHA   1 Term 10/01/19 37w5d 02:14 / 00:17 2.523 kg (5 lb 9 oz) F Vag-Spont EPI N SHOBHA      Name: JULES,FEMALE-CLARIBEL      Apgar1: 8  Apgar5: 9       GYN History: normal PAP smear 21    Medical History: reviewed  History reviewed. No pertinent past medical history.    Surgical History: reviewed  Past Surgical History:   Procedure Laterality Date     DILATION AND CURETTAGE      for \"uterine polyps\"     HC DILATION/CURETTAGE DIAG/THER NON OB N/A 2018    Procedure:  DILATION AND CURETTAGE;  Surgeon: Bishnu Dumont MD;  Location: [facility];  Service: Gynecology     HYSTEROSCOPY DIAGNOSTIC N/A 2018    Procedure: HYSTEROSCOPY;  Surgeon: Bishnu Dumont MD;  Location: [facility];  Service:      Family History: denies history of bleeding or clotting disorders     Social History:  denies use of tobacco, drugs, or alcohol    Medications: none    Allergies: reviewed  Allergies   Allergen Reactions     Amoxicillin Hives and Swelling     Lips swelled.       Vital signs:  Temp: 97.9  F (36.6  C) Temp src: Oral BP: 105/57     Resp: 18            Estimated body mass index is 24.02 kg/m  as calculated from the following:    Height as of 19: 1.727 m (5' 8\").    Weight as of 19: 71.7 kg (158 lb).    Physical Exam:  General:  no distress, breastfeeding baby  Abdomen: " uterus firm below umbilicus, nontender  Extremities: no calf pain, no edema in legs    Assessment/Plan: 32 year old  who delivered precipitously in the car at 40w4d  1. S/p precipitous    - see delivery note by CNM and placental delivery note by In House OB  - postpartum cares  2. Prenatal labs   - GBS neg   - Blood Type: A+  - Antibody screen: negative   - RPR NR, HepBsAg NR, Rubella immune, HIV NR, HepCAb NR    Discussed plan of care with patient, and the patient expressed understanding. Opportunity for questions given, and all questions were answered.    Tamara Aguirre MD

## 2023-06-09 NOTE — PLAN OF CARE
Problem: Plan of Care - These are the overarching goals to be used throughout the patient stay.    Goal: Plan of Care Review  Outcome: Regulo Sanford is planning to exclusively breastfeed her baby boy. Reviewed early hunger cues, feeding frequency and breastfeeding positions. Reminded to call to help assist with feedings and assess latch. Declined lactation consult currently.    Utilizing PRN Tylenol and Ibuprofen to help manage uterine cramping. Medicated spray, tucks and ice packs given to help manage perineum pain as well. Has lavender bath salts in room.    Fundus firm and 1 below umbilicus. Bleeding light with no clots.     Up to bathroom to void. Steady. Tolerating regular foods.    Reviewed needing to complete mood assessment and birth certificate.    Happy and pleasant. Skin to skin with baby and meeting his needs. Spouse at bedside supportive.

## 2023-06-09 NOTE — PROGRESS NOTES
I was called as the in-house obstetrician due to this patient having delivered the baby in her car or coming up to the hospital.  She is a  3 para 2-0-0-2 at 40 weeks and 4 days who is coming to the hospital in labor and want up delivering the baby in her car by the front door of the hospital.  Nursing had clamped and cut the cord on entry into the hospital.  I was called that she was coming back to her room with the need to deliver placenta.  There was minimal bleeding going on.  I delivered the placenta spontaneously with intact membranes.  There was no cervical or perineal lacerations.  Good uterine tone was achieved with intramuscular Pitocin and massage.  Estimated blood loss was approximately 50 cc during this portion of the delivery.

## 2023-06-10 VITALS
BODY MASS INDEX: 22.05 KG/M2 | HEART RATE: 57 BPM | WEIGHT: 145 LBS | SYSTOLIC BLOOD PRESSURE: 97 MMHG | OXYGEN SATURATION: 98 % | TEMPERATURE: 98.4 F | RESPIRATION RATE: 16 BRPM | DIASTOLIC BLOOD PRESSURE: 50 MMHG

## 2023-06-10 PROBLEM — Z34.90 PREGNANT: Status: ACTIVE | Noted: 2019-09-29

## 2023-06-10 PROCEDURE — 250N000013 HC RX MED GY IP 250 OP 250 PS 637: Performed by: OBSTETRICS & GYNECOLOGY

## 2023-06-10 RX ADMIN — ACETAMINOPHEN 650 MG: 325 TABLET ORAL at 10:48

## 2023-06-10 RX ADMIN — IBUPROFEN 800 MG: 800 TABLET ORAL at 10:48

## 2023-06-10 RX ADMIN — DOCUSATE SODIUM 100 MG: 100 CAPSULE, LIQUID FILLED ORAL at 08:26

## 2023-06-10 RX ADMIN — ACETAMINOPHEN 650 MG: 325 TABLET ORAL at 04:47

## 2023-06-10 RX ADMIN — IBUPROFEN 800 MG: 800 TABLET ORAL at 04:46

## 2023-06-10 ASSESSMENT — ACTIVITIES OF DAILY LIVING (ADL)
ADLS_ACUITY_SCORE: 18

## 2023-06-10 NOTE — PROGRESS NOTES
Reviewed discharge instructions, follow-up cares and warning signs. Questions answered. Home via spouse with baby in car seat.

## 2023-06-10 NOTE — PLAN OF CARE
Bleeding similar to period, no clots reported. Sitz bath on shift with relief. Right breast bruise from 1st latch, declines help with breast feeding. No questions or concerns at this time.    Problem: Plan of Care - These are the overarching goals to be used throughout the patient stay.    Goal: Optimal Comfort and Wellbeing  Outcome: Progressing  Intervention: Provide Person-Centered Care  Recent Flowsheet Documentation  Taken 6/10/2023 0440 by Claudia Clement, RN  Trust Relationship/Rapport:    care explained    choices provided    questions answered    questions encouraged    thoughts/feelings acknowledged  Taken 6/9/2023 2100 by Claudia Clement, RN  Trust Relationship/Rapport:    care explained    choices provided    questions answered    questions encouraged    thoughts/feelings acknowledged   Goal Outcome Evaluation:

## 2023-06-10 NOTE — PROGRESS NOTES
06/10/23   Claribel Rebolledo   1990    Postpartum Rounding Note    Subjective: Patient doing well. She is tolerating general diet, urinating without difficulty, and ambulating without lightheadedness. She is breastfeeding. Vaginal bleeding is within normal limits.      Vital signs:  Temp: 98.4  F (36.9  C) Temp src: Oral BP: 97/50 Pulse: 57   Resp: 16 SpO2: 98 % O2 Device: None (Room air)        Physical Exam:  General:  no distress  Abdomen: nontender, uterine fundus firm below umbilicus  Extremities: no calf pain, no edema in legs    Labs: all recent labs reviewed      Assessment/Plan: 32 year old PPD#1 s/p precipitous   1. Postpartum    - afebrile, VSS   - continue postpartum cares     Discussed plan of care with patient and , and patient expressed understanding. Opportunity for questions given, and all questions were answered.    Disposition: plan for discharge today, follow up in office in 6 weeks with Dr. Denia Cintron.      Tamara Aguirre MD

## 2023-06-10 NOTE — DISCHARGE SUMMARY
06/10/23   Claribel Rebolledo   1990    Discharge summary    Admit date: 2023     Discharge date: 06/10/23       Admit diagnoses:  1.  Precipitous delivery in car at 40 weeks 4 days    Discharge diagnoses:  1.  S/p precipitous delivery in car at 40 weeks 4 days    Hospital course:   The patient was on her way to the hospital in labor and delivered in her car in front of the hospital; a nurse and CNM from L&D were present for the delivery. The patient was transferred to L&D, and the In House OB delivered the placenta and found no lacerations. See notes.  After , she was tolerating a normal diet, urinating without difficulty, ambulating without lightheadedness, and her vaginal bleeding was within normal limits for postpartum.  She was discharged to home on postpartum day 1.    Rubella immune  Rh +    Disposition: Discharged to home with follow-up in office in 6 weeks.      Tamara Aguirre MD

## 2023-06-10 NOTE — DISCHARGE INSTRUCTIONS
Congratulations on the birth of your baby !    Please call if your bleeding is bright red more than a pad an hour and doesn't seem to be slowing down. Please call if your temperature goes above 100 degrees. If you have stitches, soaking in a warm, soapy bathtub once or twice a day the first week you are home will keep the area clean and help it heal.     You may use Tylenol 1000mg every 8 hours and/or Ibuprofen 800mg every 8 hours for pain.     Recommend taking prenatal vitamin, vitamin D 2000 units/day, and fish oil with DHA while you are breast-feeding. Assure you are eating adequate amounts of protein (about 75g per day if breastfeeding without supplementation during the first 6 months of your baby's life) as well as healthy fats (avocado, olive oil, avocado oil, coconut oil) while breast-feeding. Drink at least 3 quarts of water per day spread out throughout the day.       Call [phone] for questions during the day and for emergencies on nights and weekends.    Please make an appointment in six weeks with Dr. Denia Cintron.      Postpartum Vaginal Delivery Instructions    Activity     Ask family and friends for help when you need it.  Do not place anything in your vagina for 6 weeks.  You are not restricted on other activities, but take it easy for a few weeks to allow your body to recover from delivery.  You are able to do any activities you feel up to that point.  No driving until you have stopped taking your pain medications (usually two weeks after delivery).     Call your health care provider if you have any of these symptoms:     Increased pain, swelling, redness, or fluid around your stiches from an episiotomy or perineal tear.  A fever above 100.4 F (38 C) with or without chills when placing a thermometer under your tongue.  You soak a sanitary pad with blood within 1 hour, or you see blood clots larger than a golf ball.  Bleeding that lasts more than 6 weeks.  Vaginal discharge that smells  bad.  Severe pain, cramping or tenderness in your lower belly area.  A need to urinate more frequently (use the toilet more often), more urgently (use the toilet very quickly), or it burns when you urinate.  Nausea and vomiting.  Redness, swelling or pain around a vein in your leg.  Problems breastfeeding or a red or painful area on your breast.  Chest pain and cough or are gasping for air.  Problems coping with sadness, anxiety, or depression.  If you have any concerns about hurting yourself or the baby, call your provider immediately.   You have questions or concerns after you return home.     Keep your hands clean:  Always wash your hands before touching your perineal area and stitches.  This helps reduce your risk of infection.  If your hands aren't dirty, you may use an alcohol hand-rub to clean your hands. Keep your nails clean and short.

## 2023-06-10 NOTE — PLAN OF CARE
Problem: Plan of Care - These are the overarching goals to be used throughout the patient stay.    Goal: Plan of Care Review  Outcome: Progressing  Anticipated discharge home today.    Managing pain with PRN Tylenol and Ibuprofen. Has spray and tucks pas as well.    Exclusively breastfeeding. Utilizing comfort gels. Declines need for assistance with breastfeeding.    Up independently. Voiding. Tolerating regular foods. Fundus firm, midline and 2 below umbilicus.    Spouse at bedside supportive.
